# Patient Record
Sex: FEMALE | Race: OTHER | Employment: UNEMPLOYED | URBAN - METROPOLITAN AREA
[De-identification: names, ages, dates, MRNs, and addresses within clinical notes are randomized per-mention and may not be internally consistent; named-entity substitution may affect disease eponyms.]

---

## 2022-09-20 ENCOUNTER — OFFICE VISIT (OUTPATIENT)
Dept: ORTHOPEDIC SURGERY | Age: 42
End: 2022-09-20
Payer: MEDICAID

## 2022-09-20 VITALS — BODY MASS INDEX: 25.03 KG/M2 | WEIGHT: 136 LBS | HEIGHT: 62 IN

## 2022-09-20 DIAGNOSIS — G56.01 RIGHT CARPAL TUNNEL SYNDROME: ICD-10-CM

## 2022-09-20 DIAGNOSIS — G56.02 CARPAL TUNNEL SYNDROME ON LEFT: ICD-10-CM

## 2022-09-20 DIAGNOSIS — M79.641 BILATERAL HAND PAIN: Primary | ICD-10-CM

## 2022-09-20 DIAGNOSIS — M79.642 BILATERAL HAND PAIN: Primary | ICD-10-CM

## 2022-09-20 PROCEDURE — L3908 WHO COCK-UP NONMOLDE PRE OTS: HCPCS | Performed by: ORTHOPAEDIC SURGERY

## 2022-09-20 PROCEDURE — 99203 OFFICE O/P NEW LOW 30 MIN: CPT | Performed by: ORTHOPAEDIC SURGERY

## 2022-09-20 NOTE — PATIENT INSTRUCTIONS
From the [address] [id number] for Surgery of the Hand    Carpal Tunnel Syndrome    What is Carpal Tunnel Syndrome? Carpal tunnel syndrome is a condition affecting one of the main nerves in the wrist area. The carpal tunnel is a space created by the natural arch of the wrist bones. A thick band called the transverse carpal ligament creates a roof to the tunnel. This means that the size of the tunnel cannot change, as the bones and ligament act like solid walls. Nine tendons that bend the fingers and thumb and the median nerve pass through the tunnel. The median nerve provides feeling (sensation) to the skin of the thumb, index and middle fingers, as well as half the ring finger. The nerve also provides the communication line to the muscles at the base of the thumb (thenar muscles). Figure 1        The median nerve and nine tendons pass from the forearm into the hand, where carpal tunnel syndrome happens. Figure 2        The ligament that would be cut during surgery for carpal tunnel syndrome      Causes      The most likely cause of carpal tunnel syndrome is extra pressure on the median nerve at the wrist inside the tunnel. This extra pressure can come from swelling (inflammation) of the contents inside the tunnel. When pressure results in nerve symptoms, it is called a compressive neuropathy. While the exact carpal tunnel syndrome causes are usually unknown and due to the patients personal anatomy, there are many factors that can contribute to the increased pressure or inflammation, including:    Rheumatoid arthritis  Gout  Amyloidosis  Infections  Psoriatic arthritis  Arthritic spurs of the carpal bones  Tumors  Ganglion cysts  Wrist fracture or dislocation of the wrist  Repetitive motions performed at work or home    Even making a tight closed fist or holding the wrist in bent or extended positions can put increased pressure on the median nerve.  A prolonged or constant fist or bend (like a fist during sleep, reading a book, or some other activities) may put enough pressure to cause the numbness/tingling. If the funny feeling in the fingers just began, this is easily resolved by moving the fingers back and forth and out of the position. If the pressure continues off and on for weeks to months, the symptoms may come on faster after the activity or wrist position is created. It may also take longer for the symptoms to go away after the activity stops. Eventually, symptoms can become constant. Repetitive activities in the workplace with forceful or repetitive gripping or vibration can also increase symptoms. However, it is complicated to determine if the work activity is the main cause of the symptoms or if work is incidentally just aggravating a condition that is already present (unrelated to work). The determination of cause of symptoms requires experienced and specialized health care providers to provide an opinion, taking many factors into account. There are some risk factors that increase the chances of getting carpal tunnel syndrome. For example, women are more likely than men to experience carpal tunnel syndrome. It is more likely to occur with aging. Each decade someone is alive, there are more people that experience carpal tunnel syndrome. Thus, it is rare in children and adolescents and more common in ages 36 and over. Carpal tunnel syndrome is more common in people with obesity, diabetes, alcohol addiction, fibromyalgia and hypothyroidism. If you have carpal tunnel syndrome, your children may be more likely to get it. Also, during pregnancy, hormonal changes and extra body fluid retention may add swelling and pressure into the tunnel. While symptoms can sometimes be worse at night, sleep has not been shown to cause carpal tunnel. Some people get symptoms while driving a car. This is also not necessarily the primary cause of the problem.  The wrist position during driving or sleep, for example, may simply aggravate the symptoms. Signs and Symptoms    Some of the symptoms of carpal tunnel syndrome may include:    Numbness and tingling that is often worse at night  Waking up at night, having to shake hand or hold over the side of the bed  Fingers feeling swollen or fuzzy  Dropping objects  Weak pinch  Discomfort in wrist, hand or fingers    The main symptom of carpal tunnel syndrome is numbness and/or tingling in the thumb, index and middle fingers, and all or half of the ring finger (the side closest to the thumb). In most cases of carpal tunnel syndrome, the numbness/tingling comes on gradually. If mild, the symptoms may come and go for months or even years without worsening. The symptoms can come and go during the day or at night. The symptoms may vary based on time of day, activity or wrist position. Sometimes the fingers are numb, and other times there is normal feeling. If the condition becomes worse, the numbness may become constant. The speed of symptom worsening can vary from very gradual where it is hard to notice, or the symptoms may come on all of a sudden. In the most severe cases, the muscles at the base of the thumb become weak and shrink in size. Dropping things may happen from both the numbness and/or thumb weakness. In some types of injuries where there is deformity from a fracture or dislocation or significant internal bleeding, carpal tunnel syndrome may come on rapidly. Because the pressure rises rapidly, the nerve does not have time to adjust. This often creates more severe symptoms with pain as a noticeable symptom, and treatment is more of an emergency. It the sudden and severe nerve pressure problem is linked to an injury, the recovery is more unpredictable and slower. Discomfort or pain can also happen with gradual onset carpal tunnel syndrome. However, pain is typically not the first or main symptom.  The pain from carpal tunnel syndrome usually only occurs after the numbness and tingling has started. The pain resolves when the numbness and tingling is alleviated. Shaking the hand or bending/straightening the fingers repeatedly may alleviate the numbness and tingling. The pain of carpal tunnel syndrome may be only in the hand or may be radiated to the forearm, even to the shoulder. It is important to understand that pain without median nerve finger numbness or tingling is NOT usually carpal tunnel syndrome. Treatment    Diagnosis of Carpal Tunnel Syndrome    The diagnosis of carpal tunnel syndrome is made in many patients based on their history of symptoms and a physical examination. It is important to know which fingers experience the numbness or tingling and which fingers do not. When performing the physical exam, your hand surgeon will perform sensation testing on the palm side and the back side of the fingers and hand. He or she may also perform sensory testing of the forearm and arm because finding numbness outside the median nerve area may suggest a different problem. The doctor may also perform some tests including the Phalens maneuver, the Tinels test, and a compression test. These tests are designed to increase pressure on the median nerve to cause your symptoms to appear. Electrodiagnostic studies (EMG) may also be used in the diagnosis. These provide evidence of nerve function or dysfunction. Also, they can help to find other causes of numbness which may have similar symptoms, such as diabetic neuropathy or cervical radiculopathy. Magnetic Resonance Imaging (MRI) and ultrasound studies are anatomic imaging tests that visualize the size of the median nerve and may also be used. Anatomic imaging tests are often helpful when other conditions are suspected like a ligament tear or tendinitis that may contribute to pain. Therefore, it is possible that your doctor will diagnose you with a condition other than carpal tunnel syndrome.  Other conditions that can cause numbness/tingling in the hand include compression conditions in the forearm, elbow, shoulder, or neck, fibromyalgia, myofascial pain syndrome and peripheral neuropathy. Non-Surgical Treatment    The main goal of treatment is to reduce or remove the causes of increased nerve pressure. This should result in a decrease in symptoms. Some non-surgical treatment options may include:    Oral anti-inflammatory medicine    Steroid injection (cortisone shot)    Wrist splint(s)    Oral medications and injections are more effective when symptoms are present for a short period of time, infrequent and mild. Wrist splinting, mainly at night, keeps the wrist out of a bent position. Wrist splints are most helpful with symptoms that are affected by the hand or wrist position. Splints are also more helpful when the symptoms are mild and when symptoms have been present for a shorter period of time. However, splints have been shown to improve, but not cure symptoms, even when carpal tunnel is severe. It can also be useful to limit activities that bring on numbness and tingling. Surgical Treatment    Surgical release of the carpal tunnel ligament is one of the most effective treatments. It takes the extra pressure off the nerve immediately and reliably. There are several different surgical techniques to cut the transverse carpal ligament. By opening the ligament, there is more room for the tendons and the nerve to pass through the tunnel without pressure. After carpal tunnel release surgery, your surgeon may recommend temporarily avoiding certain activities. Moving the fingers right away and frequently after a surgical procedure helps to limit stiffness, swelling, and adhesions. Adhesions are areas of scar tissue that can form and link the nerve to the tendons it rests on. If adhesions form, the moving tendon will pull on the nerve and may cause symptoms.  Early, gentle finger and wrist motion can help avoid adhesions and encourage tendons and nerves to move separately. The surgical scar and surrounding area may go through some mild changes in color, firmness and tenderness over the first several months. These changes are normal. Most scars wont change much after four months. You may work with a hand therapist, who will give instructions on exercises and scar massage to get your hand function back to normal. Returning to work after carpal tunnel surgery is dependent upon each persons symptoms, job demands and employer policies. In patients with less physically demanding jobs, they will be able to return to work in a few days, where other jobs may take weeks to safely return. Your surgeon and therapist will discuss the best recovery plan with you. After surgery, most patients have very little pain at the incision site. Numerous scientific studies have shown surgical pain can be well-controlled with acetaminophen, non-steroidal anti-inflammatory drugs (NSAIDs) (like Ibuprofen), ice, elevation, early gentle finger and wrist motion, and limiting activity to things that are comfortable. Very rarely a patient may require a few doses of a stronger opioid pain medication within the first 2-3 days. It is very important to limit this type of strong pain medication to decrease the chance of becoming dependent on the medicine, having a tolerance build up, or having withdrawal symptoms when trying to stop it. Multiple medical organizations and state licensing boards have established guidelines and policies on safe prescribing of pain medication. Please discuss any concerns with your surgeon prior to any surgery to make sure everyone is on the same page and expectations can be clearly set and understood. Outcomes After Surgery    Better results will occur when carpal tunnel is recognized and treated earlier. This means seeking treatment when your symptoms are not severe, and they come and go.  There will likely be complete return of feeling and muscle use after surgery. Relief may even be immediate. However, if the symptoms are severe and constant when you seek treatment, the final result may be unknown. Your healing may be slow (six or more months). If there was permanent damage before surgery, you will likely still feel numbness. Unfortunately, there is no test to tell if the symptoms are reversible. If you lost use of your thumb muscle, that will likely persist, at least to some degree.

## 2022-09-20 NOTE — PROGRESS NOTES
Abyb Espinoza (: 1980) is a 38 y.o. female patient here for evaluation of the following chief complaint(s):  Hand Pain (Bilateral carpal tunnel)       ASSESSMENT/PLAN:  Below is the assessment and plan developed based on review of pertinent history, physical exam, labs, studies, and medications. 1. Bilateral hand pain  -     EMG TWO EXTREMITIES UPPER; Future  -     [place] [facility] [place]     WRIST COCK-UP NON-MOLDED  2. Right carpal tunnel syndrome  3. Carpal tunnel syndrome on left      53-year-old female with bilateral carpal tunnel syndrome. We discussed treatment options and she was given wrist braces today to wear at night. We also discussed obtaining nerve conduction study/EMG and she will follow-up to discuss those results. She can use over-the-counter medications otherwise for pain relief. We will use EMG to further guide treatment. Patient verbalized understanding and elected to proceed. All questions were answered to the patient's apparent satisfaction. SUBJECTIVE/OBJECTIVE:  HPI    53-year-old female with bilateral hand numbness and tingling. This is been going on for about 1 year but it is gotten much worse over the last 2 months and then even more severe over the last 2 weeks. The left side is worse than the right. Right side just has a little bit of tingling but left side has significant night pain as well as numbness and tingling at night and driving the symptoms occur as well. Also when using the phone. Patient reports a gradual onset of symptoms. Duration of problem 1 year. Symptom Severity 9/10  Symptom Frequency intermittent        No Known Allergies    No current outpatient medications on file. No current facility-administered medications for this visit.        Social History     Socioeconomic History    Marital status:      Spouse name: Not on file    Number of children: Not on file    Years of education: Not on file    Highest education level: Not on file Occupational History    Not on file   Tobacco Use    Smoking status: Never     Passive exposure: Never    Smokeless tobacco: Never   Vaping Use    Vaping Use: Never used   Substance and Sexual Activity    Alcohol use: No    Drug use: Never    Sexual activity: Not Currently   Other Topics Concern    Not on file   Social History Narrative    Not on file     Social Determinants of Health     Financial Resource Strain: Not on file   Food Insecurity: Not on file   Transportation Needs: Not on file   Physical Activity: Not on file   Stress: Not on file   Social Connections: Not on file   Intimate Partner Violence: Not on file   Housing Stability: Not on file       History reviewed. No pertinent surgical history. History reviewed. No pertinent family history. Review of Systems    No flowsheet data found. Vitals:  Ht 5' 2\" (1.575 m)   Wt 136 lb (61.7 kg)   BMI 24.87 kg/m²    Estimated body surface area is 1.64 meters squared as calculated from the following:    Height as of this encounter: 5' 2\" (1.575 m). Weight as of this encounter: 136 lb (61.7 kg). Body mass index is 24.87 kg/m². Physical Exam    Musculoskeletal Exam:    Bilateral NEURO EXAM:    Phalen's: Positive    MNCT: Positive    Thenar Atrophy: none    Tinel's MN: Positive    APB STRENGTH: 5/5    1st DI Strength: 5/5      Elbow Flexion Test: Negative    Ring/Small Clawing: Negative    Tinel's UN: Negative          Patient fires AIN, PIN and ulnar nerves. Sensation is grossly intact in the median, radial and ulnar distribution. Hand is pink and appears well-perfused. Hand is warm. Skin is intact. Compartments are soft and compressible.       Consitutional: Healthy  Skin:   - Edema - none  - Cellulitis - No    Neuro: Numbness or tingling in R/L arm: Yes    Psych: Affect normal    Cardiovascular: Capillary Refill < 2 seconds in upper extremities    Respiratory: Non-Labored Breathing    ROS:    Constitutional: Denies fever/chills    Respiratory: Denies SOB            Orders Placed This Encounter    [address] TO DME [[id number]]     Referral Priority:   Routine     Referral Type:   Consultation     Referral Reason:   Specialty Services Required     Referred to Provider:   Altaf Hernandez MD     Number of Visits Requested:   1    EMG TWO EXTREMITIES UPPER     Standing Status:   Future     Standing Expiration Date:   3/20/2023     Order Specific Question:   Reason for Exam:     Answer:   bilateral carpal tunnel syndrome    WRIST BRACE 8\"     Bilateral wrist brace          An electronic signature was used to authenticate this note.   -- Candy Hays MD

## 2022-10-19 ENCOUNTER — OFFICE VISIT (OUTPATIENT)
Dept: ORTHOPEDIC SURGERY | Age: 42
End: 2022-10-19
Payer: MEDICAID

## 2022-10-19 VITALS — HEIGHT: 62 IN | WEIGHT: 136 LBS | BODY MASS INDEX: 25.03 KG/M2

## 2022-10-19 DIAGNOSIS — G56.02 CARPAL TUNNEL SYNDROME ON LEFT: Primary | ICD-10-CM

## 2022-10-19 PROCEDURE — 99213 OFFICE O/P EST LOW 20 MIN: CPT | Performed by: ORTHOPAEDIC SURGERY

## 2022-10-19 RX ORDER — ALPRAZOLAM 0.5 MG/1
TABLET ORAL
COMMUNITY
Start: 2022-07-22

## 2022-10-19 NOTE — PROGRESS NOTES
Alton Heart (: 1980) is a 38 y.o. female patient here for evaluation of the following chief complaint(s):  Hand Pain (Left hand EMG results)       ASSESSMENT/PLAN:  Below is the assessment and plan developed based on review of pertinent history, physical exam, labs, studies, and medications. 1. Carpal tunnel syndrome on left  -     REFERRAL TO SURGERY    44-year-old female with bilateral carpal tunnel syndrome. We discussed treatment options including injection, continued bracing or surgery patient wanted to proceed with surgery for the left side. We discussed risks, benefits and alternatives to surgery. After thorough discussion ultimately the patient elected to proceed with operative intervention. We discussed the risks including but not limited to postoperative pain, swelling, bruising, bleeding, scarring, infection, damage to neurovascular structures. Risk of permanent or temporary loss of range of motion, need for additional surgery, rejection of foreign material such as metal, suture or other tissue. Plan is for left carpal tunnel release. Patient verbalized understanding and elected to proceed. All questions were answered to the patient's apparent satisfaction. SUBJECTIVE/OBJECTIVE:  HPI    44-year-old female with bilateral hand numbness and tingling following up on EMG. This did show mild to moderately severe bilateral focal median neuropathies at the wrists consistent with bilateral carpal tunnel syndrome. Left remains worse than right. Patient reports a gradual onset of symptoms. Duration of problem 1 year. Symptom Severity 9/10  Symptom Frequency intermittent        No Known Allergies    Current Outpatient Medications   Medication Sig    ALPRAZolam (XANAX) 0.5 mg tablet      No current facility-administered medications for this visit.        Social History     Socioeconomic History    Marital status:      Spouse name: Not on file    Number of children: Not on file    Years of education: Not on file    Highest education level: Not on file   Occupational History    Not on file   Tobacco Use    Smoking status: Never     Passive exposure: Never    Smokeless tobacco: Never   Vaping Use    Vaping Use: Never used   Substance and Sexual Activity    Alcohol use: No    Drug use: Never    Sexual activity: Not Currently   Other Topics Concern    Not on file   Social History Narrative    Not on file     Social Determinants of Health     Financial Resource Strain: Not on file   Food Insecurity: Not on file   Transportation Needs: Not on file   Physical Activity: Not on file   Stress: Not on file   Social Connections: Not on file   Intimate Partner Violence: Not on file   Housing Stability: Not on file       History reviewed. No pertinent surgical history. History reviewed. No pertinent family history. Review of Systems    No flowsheet data found. Vitals:  Ht 5' 2\" (1.575 m)   Wt 136 lb (61.7 kg)   BMI 24.87 kg/m²    Estimated body surface area is 1.64 meters squared as calculated from the following:    Height as of this encounter: 5' 2\" (1.575 m). Weight as of this encounter: 136 lb (61.7 kg). Body mass index is 24.87 kg/m². Physical Exam    Musculoskeletal Exam:    Bilateral NEURO EXAM:    Phalen's: Positive    MNCT: Positive    Thenar Atrophy: none    Tinel's MN: Positive    APB STRENGTH: 5/5    1st DI Strength: 5/5      Elbow Flexion Test: Negative    Ring/Small Clawing: Negative    Tinel's UN: Negative          Patient fires AIN, PIN and ulnar nerves. Sensation is grossly intact in the median, radial and ulnar distribution. Hand is pink and appears well-perfused. Hand is warm. Skin is intact. Compartments are soft and compressible.       Consitutional: Healthy  Skin:   - Edema - none  - Cellulitis - No    Neuro: Numbness or tingling in R/L arm: Yes    Psych: Affect normal    Cardiovascular: Capillary Refill < 2 seconds in upper extremities    Respiratory: Non-Labored Breathing    ROS:    Constitutional: Denies fever/chills    Respiratory: Denies SOB            Orders Placed This Encounter    REFERRAL TO SURGERY     Referral Priority:   Routine     Referral Type:   Consultation     Referral Reason:   Specialty Services Required     Referred to Provider:   Alysa Mayen MD     Number of Visits Requested:   1          An electronic signature was used to authenticate this note.   -- Bryanna Zhou MD

## 2022-11-16 DIAGNOSIS — G56.02 CARPAL TUNNEL SYNDROME ON LEFT: Primary | ICD-10-CM

## 2022-11-16 RX ORDER — HYDROCODONE BITARTRATE AND ACETAMINOPHEN 5; 325 MG/1; MG/1
1 TABLET ORAL
Qty: 10 TABLET | Refills: 0 | Status: SHIPPED | OUTPATIENT
Start: 2022-11-16 | End: 2022-11-21

## 2022-11-21 ENCOUNTER — OFFICE VISIT (OUTPATIENT)
Dept: ORTHOPEDIC SURGERY | Age: 42
End: 2022-11-21
Payer: MEDICAID

## 2022-11-21 VITALS — HEIGHT: 62 IN | WEIGHT: 136 LBS | BODY MASS INDEX: 25.03 KG/M2

## 2022-11-21 DIAGNOSIS — G56.02 CARPAL TUNNEL SYNDROME ON LEFT: Primary | ICD-10-CM

## 2022-11-21 PROCEDURE — 99024 POSTOP FOLLOW-UP VISIT: CPT | Performed by: ORTHOPAEDIC SURGERY

## 2022-11-21 NOTE — LETTER
NOTIFICATION RETURN TO WORK / SCHOOL    11/21/2022 10:55 AM    Ms. Jessica Elias  [address] [place] [id number]      To Whom It May Concern:    Jessica Elias is currently under the care of [facility]. She will return to work/school on: 11/23/2022. She will need to be on light duty after surgery, no lifting greater than 1 pound, okay for desk work and other light activities. If there are questions or concerns please have the patient contact our office.         Sincerely,      Vonnie Ott MD

## 2022-11-21 NOTE — PROGRESS NOTES
Cristine Gould (: 1980) is a 38 y.o. female patient here for evaluation of the following chief complaint(s):  Hand Pain (Left carpal tunnel release sx )       ASSESSMENT/PLAN:  Below is the assessment and plan developed based on review of pertinent history, physical exam, labs, studies, and medications. 1. Carpal tunnel syndrome on left      Patient is doing well after left carpal tunnel release. She was concerned about some bruising and was told to come and everything looks good today. And as expected. She was given a work note. Follow-up next week as scheduled for suture removal.          Patient verbalized understanding and elected to proceed. All questions were answered to the patient's apparent satisfaction. SUBJECTIVE/OBJECTIVE:    Patient is here today for a postoperative visit. Date of Surgery: 22    Patient was concerned about some bruising in her hand and was told to come in. Numbness and tingling has improved in night symptoms have resolved. No Known Allergies    Current Outpatient Medications   Medication Sig    HYDROcodone-acetaminophen (Norco) 5-325 mg per tablet Take 1 Tablet by mouth every six (6) hours as needed for Pain for up to 5 days. Max Daily Amount: 4 Tablets. ALPRAZolam (XANAX) 0.5 mg tablet      No current facility-administered medications for this visit.        Social History     Socioeconomic History    Marital status:      Spouse name: Not on file    Number of children: Not on file    Years of education: Not on file    Highest education level: Not on file   Occupational History    Not on file   Tobacco Use    Smoking status: Never     Passive exposure: Never    Smokeless tobacco: Never   Vaping Use    Vaping Use: Never used   Substance and Sexual Activity    Alcohol use: No    Drug use: Never    Sexual activity: Not Currently   Other Topics Concern    Not on file   Social History Narrative    Not on file     Social Determinants of Health     Financial Resource Strain: Not on file   Food Insecurity: Not on file   Transportation Needs: Not on file   Physical Activity: Not on file   Stress: Not on file   Social Connections: Not on file   Intimate Partner Violence: Not on file   Housing Stability: Not on file       History reviewed. No pertinent surgical history. History reviewed. No pertinent family history. Review of Systems    No flowsheet data found. Vitals:  Ht 5' 2\" (1.575 m)   Wt 136 lb (61.7 kg)   BMI 24.87 kg/m²    Estimated body surface area is 1.64 meters squared as calculated from the following:    Height as of this encounter: 5' 2\" (1.575 m). Weight as of this encounter: 136 lb (61.7 kg). Body mass index is 24.87 kg/m². Physical Exam    Musculoskeletal Exam:    Left Upper Extremity Exam:    Evaluation of the patient's postoperative site shows that the incision is intact and healing appropriately. There are no signs of infection, no redness, no warmth, no erythema. There is no purulent drainage noted. Patient fires AIN, PIN and ulnar nerves. Sensation is grossly intact in the median, radial and ulnar distribution. Hand is pink and appears well-perfused. Hand is warm. Mild palmar bruising and distal forearm bruising is noted. Consitutional: Healthy  Skin:   - Edema - mild  - Cellulitis - No    Neuro: Numbness or tingling in R/L arm: mild    Psych: Affect normal    Cardiovascular: Capillary Refill < 2 seconds in upper extremities    Respiratory: Non-Labored Breathing      ROS:    Constitutional: Denies fever/chills    Respiratory: Denies SOB        An electronic signature was used to authenticate this note.   -- Silvina Ye MD

## 2022-11-30 ENCOUNTER — OFFICE VISIT (OUTPATIENT)
Dept: ORTHOPEDIC SURGERY | Age: 42
End: 2022-11-30
Payer: MEDICAID

## 2022-11-30 VITALS — WEIGHT: 136 LBS | HEIGHT: 62 IN | BODY MASS INDEX: 25.03 KG/M2

## 2022-11-30 DIAGNOSIS — G56.02 CARPAL TUNNEL SYNDROME ON LEFT: Primary | ICD-10-CM

## 2022-11-30 PROCEDURE — 99024 POSTOP FOLLOW-UP VISIT: CPT | Performed by: ORTHOPAEDIC SURGERY

## 2022-11-30 NOTE — PROGRESS NOTES
Mayra Dias (: 1980) is a 38 y.o. female patient here for evaluation of the following chief complaint(s):  Hand Pain (Left hand)       ASSESSMENT/PLAN:  Below is the assessment and plan developed based on review of pertinent history, physical exam, labs, studies, and medications. 1. Carpal tunnel syndrome on left      Patient is doing well after left carpal tunnel release. Discussed increasing activity. Follow-up in 2 months for final check. Patient verbalized understanding and elected to proceed. All questions were answered to the patient's apparent satisfaction. SUBJECTIVE/OBJECTIVE:    Patient is here today for a postoperative visit. Date of Surgery: 22    Overall she has improved significantly she states. Still some intermittent tingling. No Known Allergies    Current Outpatient Medications   Medication Sig    ALPRAZolam (XANAX) 0.5 mg tablet      No current facility-administered medications for this visit. Social History     Socioeconomic History    Marital status:      Spouse name: Not on file    Number of children: Not on file    Years of education: Not on file    Highest education level: Not on file   Occupational History    Not on file   Tobacco Use    Smoking status: Never     Passive exposure: Never    Smokeless tobacco: Never   Vaping Use    Vaping Use: Never used   Substance and Sexual Activity    Alcohol use: No    Drug use: Never    Sexual activity: Not Currently   Other Topics Concern    Not on file   Social History Narrative    Not on file     Social Determinants of Health     Financial Resource Strain: Not on file   Food Insecurity: Not on file   Transportation Needs: Not on file   Physical Activity: Not on file   Stress: Not on file   Social Connections: Not on file   Intimate Partner Violence: Not on file   Housing Stability: Not on file       History reviewed. No pertinent surgical history. History reviewed.  No pertinent family history. Review of Systems    No flowsheet data found. Vitals:  Ht 5' 2\" (1.575 m)   Wt 136 lb (61.7 kg)   BMI 24.87 kg/m²    Estimated body surface area is 1.64 meters squared as calculated from the following:    Height as of this encounter: 5' 2\" (1.575 m). Weight as of this encounter: 136 lb (61.7 kg). Body mass index is 24.87 kg/m². Physical Exam    Musculoskeletal Exam:    Left Upper Extremity Exam:    Evaluation of the patient's postoperative site shows that the incision is intact and healing appropriately. There are no signs of infection, no redness, no warmth, no erythema. There is no purulent drainage noted. Patient fires AIN, PIN and ulnar nerves. Sensation is grossly intact in the median, radial and ulnar distribution. Hand is pink and appears well-perfused. Hand is warm. Mild palmar bruising and distal forearm bruising is noted. Consitutional: Healthy  Skin:   - Edema - mild  - Cellulitis - No    Neuro: Numbness or tingling in R/L arm: mild    Psych: Affect normal    Cardiovascular: Capillary Refill < 2 seconds in upper extremities    Respiratory: Non-Labored Breathing      ROS:    Constitutional: Denies fever/chills    Respiratory: Denies SOB        An electronic signature was used to authenticate this note.   -- Natali Major MD

## 2023-01-31 ENCOUNTER — OFFICE VISIT (OUTPATIENT)
Dept: ORTHOPEDIC SURGERY | Age: 43
End: 2023-01-31
Payer: MEDICAID

## 2023-01-31 VITALS — WEIGHT: 135 LBS | HEIGHT: 62 IN | BODY MASS INDEX: 24.84 KG/M2

## 2023-01-31 DIAGNOSIS — G56.02 CARPAL TUNNEL SYNDROME ON LEFT: Primary | ICD-10-CM

## 2023-01-31 PROCEDURE — 99024 POSTOP FOLLOW-UP VISIT: CPT | Performed by: ORTHOPAEDIC SURGERY

## 2023-01-31 NOTE — PROGRESS NOTES
Cristine Gould (: 1980) is a 38 y.o. female patient here for evaluation of the following chief complaint(s):  Hand Pain (Left hand carpal tunnel release)       ASSESSMENT/PLAN:  Below is the assessment and plan developed based on review of pertinent history, physical exam, labs, studies, and medications. 1. Carpal tunnel syndrome on left      Patient is doing well after left carpal tunnel release. Can follow-up as needed at this time. A little bit of intermittent soreness still present, discussed brace if needed and thumb brace as that seems to be more of the painful side. Patient verbalized understanding and elected to proceed. All questions were answered to the patient's apparent satisfaction. SUBJECTIVE/OBJECTIVE:    Patient is here today for a postoperative visit. Date of Surgery: 22    Overall reports he is doing very well with no major concerns today still some intermittent soreness but otherwise doing really well. No Known Allergies    Current Outpatient Medications   Medication Sig    ALPRAZolam (XANAX) 0.5 mg tablet      No current facility-administered medications for this visit.        Social History     Socioeconomic History    Marital status:      Spouse name: Not on file    Number of children: Not on file    Years of education: Not on file    Highest education level: Not on file   Occupational History    Not on file   Tobacco Use    Smoking status: Never     Passive exposure: Never    Smokeless tobacco: Never   Vaping Use    Vaping Use: Never used   Substance and Sexual Activity    Alcohol use: No    Drug use: Never    Sexual activity: Not Currently   Other Topics Concern    Not on file   Social History Narrative    Not on file     Social Determinants of Health     Financial Resource Strain: Not on file   Food Insecurity: Not on file   Transportation Needs: Not on file   Physical Activity: Not on file   Stress: Not on file   Social Connections: Not on file   Intimate Partner Violence: Not on file   Housing Stability: Not on file       Past Surgical History:   Procedure Laterality Date    [place] UNLISTED PROCEDURE HANDS/FINGERS Left 11/16/2022    carpal tunnel release - Dr. Sofie Menendez       History reviewed. No pertinent family history. Review of Systems    No flowsheet data found. Vitals:  Ht 5' 2\" (1.575 m)   Wt 135 lb (61.2 kg)   BMI 24.69 kg/m²    Estimated body surface area is 1.64 meters squared as calculated from the following:    Height as of this encounter: 5' 2\" (1.575 m). Weight as of this encounter: 135 lb (61.2 kg). Body mass index is 24.69 kg/m². Physical Exam    Musculoskeletal Exam:    Left Upper Extremity Exam:    Evaluation of the patient's postoperative site shows that the incision is intact and healed appropriately. There are no signs of infection, no redness, no warmth, no erythema. There is no purulent drainage noted. Patient fires AIN, PIN and ulnar nerves. Sensation is grossly intact in the median, radial and ulnar distribution. Hand is pink and appears well-perfused. Hand is warm. Consitutional: Healthy  Skin:   - Edema - none  - Cellulitis - No    Neuro: Numbness or tingling in R/L arm: mild    Psych: Affect normal    Cardiovascular: Capillary Refill < 2 seconds in upper extremities    Respiratory: Non-Labored Breathing      ROS:    Constitutional: Denies fever/chills    Respiratory: Denies SOB        An electronic signature was used to authenticate this note.   -- Aniyah Bush MD